# Patient Record
Sex: FEMALE | Race: WHITE | Employment: FULL TIME | URBAN - METROPOLITAN AREA
[De-identification: names, ages, dates, MRNs, and addresses within clinical notes are randomized per-mention and may not be internally consistent; named-entity substitution may affect disease eponyms.]

---

## 2022-12-22 ENCOUNTER — OFFICE VISIT (OUTPATIENT)
Dept: URGENT CARE | Age: 22
End: 2022-12-22

## 2022-12-22 VITALS
WEIGHT: 159 LBS | HEIGHT: 64 IN | BODY MASS INDEX: 27.14 KG/M2 | SYSTOLIC BLOOD PRESSURE: 124 MMHG | DIASTOLIC BLOOD PRESSURE: 74 MMHG | RESPIRATION RATE: 16 BRPM | HEART RATE: 95 BPM | TEMPERATURE: 99 F | OXYGEN SATURATION: 98 %

## 2022-12-22 DIAGNOSIS — J10.1 INFLUENZA A: Primary | ICD-10-CM

## 2022-12-22 DIAGNOSIS — R03.0 ELEVATED BP WITHOUT DIAGNOSIS OF HYPERTENSION: ICD-10-CM

## 2022-12-22 LAB
INFLUENZA VIRUS A RNA: ABNORMAL
INFLUENZA VIRUS B RNA: ABNORMAL

## 2022-12-22 RX ORDER — OSELTAMIVIR PHOSPHATE 75 MG/1
75 CAPSULE ORAL 2 TIMES DAILY
Qty: 10 CAPSULE | Refills: 0 | Status: SHIPPED | OUTPATIENT
Start: 2022-12-22 | End: 2022-12-27

## 2022-12-22 ASSESSMENT — ENCOUNTER SYMPTOMS
SORE THROAT: 1
DIARRHEA: 1
RHINORRHEA: 1
COUGH: 0
SHORTNESS OF BREATH: 1
SINUS PRESSURE: 1
SINUS PAIN: 1
NAUSEA: 0
VOMITING: 0

## 2022-12-22 NOTE — PROGRESS NOTES
Denise Velasco (:  2000) is a 23 y.o. female,New patient, here for evaluation of the following chief complaint(s):  Pharyngitis (Recent exposure to strep and flu ) and Generalized Body Aches      ASSESSMENT/PLAN:    ICD-10-CM    1. Influenza A  J10.1 POCT Influenza A/B DNA (Alere i)     oseltamivir (TAMIFLU) 75 MG capsule      2. Elevated BP without diagnosis of hypertension  R03.0           Influenza A positive in clinic today. Results reviewed with patient. Risks and benefits of Tamiflu discussed. Rest, hydrate, OTC symptom relief. Monitor BP at home and call PCP if persistently elevated. Follow up in 5 days if symptoms persist or if symptoms worsen. SUBJECTIVE/OBJECTIVE:    History provided by:  Patient   used: No    HPI:   23 y.o. female presents with symptoms of headache, body aches, hot/cold, and \"throat feels different\" ongoing since 2022. Denies known fever. Has taken Tylenol for symptoms with mild relief. 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 Vitals:    22 1308   BP: 124/74   Site: Right Upper Arm   Position: Sitting   Cuff Size: Medium Adult   Pulse: 95   Resp: 16   Temp: 99 °F (37.2 °C)   SpO2: 98%   Weight: 159 lb (72.1 kg)   Height: 5' 4\" (1.626 m)       Review of Systems   Constitutional:  Positive for chills, diaphoresis and fatigue. Negative for fever. HENT:  Positive for congestion, rhinorrhea, sinus pressure, sinus pain and sore throat. Negative for ear pain. Respiratory:  Positive for shortness of breath. Negative for cough. Gastrointestinal:  Positive for diarrhea. Negative for nausea and vomiting. Neurological:  Positive for headaches. Physical Exam  Vitals reviewed. Constitutional:       Appearance: Normal appearance. HENT:      Head: Normocephalic. Right Ear: Ear canal and external ear normal. There is impacted cerumen.       Left Ear: Tympanic membrane, ear canal and external ear normal.      Nose: Nose normal.      Mouth/Throat:      Mouth: Mucous membranes are moist.      Pharynx: Oropharynx is clear. Posterior oropharyngeal erythema present. No oropharyngeal exudate. Eyes:      Pupils: Pupils are equal, round, and reactive to light. Cardiovascular:      Rate and Rhythm: Normal rate and regular rhythm. Pulses: Normal pulses. Heart sounds: Normal heart sounds. Pulmonary:      Effort: Pulmonary effort is normal.      Breath sounds: Normal breath sounds. Musculoskeletal:      Cervical back: Normal range of motion and neck supple. Neurological:      Mental Status: She is alert. Psychiatric:         Mood and Affect: Mood normal.         Behavior: Behavior normal.         An electronic signature was used to authenticate this note.     --Jena Singh, APRN - CNP
